# Patient Record
Sex: FEMALE | Race: WHITE | NOT HISPANIC OR LATINO
[De-identification: names, ages, dates, MRNs, and addresses within clinical notes are randomized per-mention and may not be internally consistent; named-entity substitution may affect disease eponyms.]

---

## 2017-01-04 ENCOUNTER — APPOINTMENT (OUTPATIENT)
Dept: OBGYN | Facility: CLINIC | Age: 47
End: 2017-01-04

## 2018-02-27 ENCOUNTER — APPOINTMENT (OUTPATIENT)
Dept: OBGYN | Facility: CLINIC | Age: 48
End: 2018-02-27
Payer: COMMERCIAL

## 2018-02-27 VITALS
SYSTOLIC BLOOD PRESSURE: 130 MMHG | BODY MASS INDEX: 35.49 KG/M2 | WEIGHT: 213 LBS | DIASTOLIC BLOOD PRESSURE: 84 MMHG | HEIGHT: 65 IN

## 2018-02-27 PROCEDURE — 99396 PREV VISIT EST AGE 40-64: CPT

## 2018-02-28 ENCOUNTER — MESSAGE (OUTPATIENT)
Age: 48
End: 2018-02-28

## 2018-02-28 LAB — HPV HIGH+LOW RISK DNA PNL CVX: NOT DETECTED

## 2018-03-01 ENCOUNTER — ASOB RESULT (OUTPATIENT)
Age: 48
End: 2018-03-01

## 2018-03-01 ENCOUNTER — APPOINTMENT (OUTPATIENT)
Dept: OBGYN | Facility: CLINIC | Age: 48
End: 2018-03-01
Payer: COMMERCIAL

## 2018-03-01 PROCEDURE — 76830 TRANSVAGINAL US NON-OB: CPT

## 2018-03-03 ENCOUNTER — MESSAGE (OUTPATIENT)
Age: 48
End: 2018-03-03

## 2018-03-03 LAB — CYTOLOGY CVX/VAG DOC THIN PREP: NORMAL

## 2018-07-31 ENCOUNTER — APPOINTMENT (OUTPATIENT)
Dept: OBGYN | Facility: CLINIC | Age: 48
End: 2018-07-31
Payer: COMMERCIAL

## 2018-07-31 ENCOUNTER — MESSAGE (OUTPATIENT)
Age: 48
End: 2018-07-31

## 2018-07-31 VITALS
HEIGHT: 65 IN | WEIGHT: 200 LBS | BODY MASS INDEX: 33.32 KG/M2 | SYSTOLIC BLOOD PRESSURE: 131 MMHG | DIASTOLIC BLOOD PRESSURE: 88 MMHG

## 2018-07-31 DIAGNOSIS — R10.31 RIGHT LOWER QUADRANT PAIN: ICD-10-CM

## 2018-07-31 DIAGNOSIS — G89.29 RIGHT LOWER QUADRANT PAIN: ICD-10-CM

## 2018-07-31 LAB — HCG UR QL: NEGATIVE

## 2018-07-31 PROCEDURE — 99214 OFFICE O/P EST MOD 30 MIN: CPT

## 2018-07-31 PROCEDURE — 81025 URINE PREGNANCY TEST: CPT

## 2018-08-01 ENCOUNTER — MESSAGE (OUTPATIENT)
Age: 48
End: 2018-08-01

## 2018-08-01 ENCOUNTER — ASOB RESULT (OUTPATIENT)
Age: 48
End: 2018-08-01

## 2018-08-01 ENCOUNTER — APPOINTMENT (OUTPATIENT)
Dept: OBGYN | Facility: CLINIC | Age: 48
End: 2018-08-01
Payer: COMMERCIAL

## 2018-08-01 LAB
C TRACH RRNA SPEC QL NAA+PROBE: NOT DETECTED
N GONORRHOEA RRNA SPEC QL NAA+PROBE: NOT DETECTED
SOURCE AMPLIFICATION: NORMAL

## 2018-08-01 PROCEDURE — 76830 TRANSVAGINAL US NON-OB: CPT

## 2020-03-10 ENCOUNTER — APPOINTMENT (OUTPATIENT)
Dept: OBGYN | Facility: CLINIC | Age: 50
End: 2020-03-10
Payer: COMMERCIAL

## 2020-03-10 VITALS
WEIGHT: 197 LBS | HEIGHT: 65 IN | DIASTOLIC BLOOD PRESSURE: 86 MMHG | BODY MASS INDEX: 32.82 KG/M2 | SYSTOLIC BLOOD PRESSURE: 145 MMHG

## 2020-03-10 PROCEDURE — 99396 PREV VISIT EST AGE 40-64: CPT

## 2020-03-10 NOTE — PHYSICAL EXAM
[Awake] : awake [Alert] : alert [Acute Distress] : no acute distress [Thyroid Nodule] : no thyroid nodule [Goiter] : no goiter [Mass] : no breast mass [Nipple Discharge] : no nipple discharge [Axillary LAD] : no axillary lymphadenopathy [Soft] : soft [Tender] : non tender [Oriented x3] : oriented to person, place, and time [Normal] : uterus [No Bleeding] : there was no active vaginal bleeding [IUD String] : had an IUD string protruding out [Uterine Adnexae] : were not tender and not enlarged [RRR, No Murmurs] : RRR, no murmurs [CTAB] : CTAB

## 2020-03-11 ENCOUNTER — MESSAGE (OUTPATIENT)
Age: 50
End: 2020-03-11

## 2020-03-11 LAB — HPV HIGH+LOW RISK DNA PNL CVX: NOT DETECTED

## 2020-03-13 ENCOUNTER — MESSAGE (OUTPATIENT)
Age: 50
End: 2020-03-13

## 2020-03-13 LAB — CYTOLOGY CVX/VAG DOC THIN PREP: NORMAL

## 2020-03-24 ENCOUNTER — APPOINTMENT (OUTPATIENT)
Dept: OBGYN | Facility: CLINIC | Age: 50
End: 2020-03-24
Payer: COMMERCIAL

## 2020-03-24 ENCOUNTER — ASOB RESULT (OUTPATIENT)
Age: 50
End: 2020-03-24

## 2020-03-24 PROCEDURE — 76830 TRANSVAGINAL US NON-OB: CPT

## 2020-05-18 ENCOUNTER — APPOINTMENT (OUTPATIENT)
Dept: OBGYN | Facility: CLINIC | Age: 50
End: 2020-05-18
Payer: COMMERCIAL

## 2020-05-18 VITALS
WEIGHT: 197 LBS | DIASTOLIC BLOOD PRESSURE: 84 MMHG | BODY MASS INDEX: 32.82 KG/M2 | SYSTOLIC BLOOD PRESSURE: 126 MMHG | HEIGHT: 65 IN

## 2020-05-18 PROCEDURE — 58301 REMOVE INTRAUTERINE DEVICE: CPT

## 2020-05-18 PROCEDURE — 58300 INSERT INTRAUTERINE DEVICE: CPT

## 2020-05-18 NOTE — PROCEDURE
[IUD Placement] : intrauterine device (IUD) placement [Prevention of Pregnancy] : prevention of pregnancy [Infection] : infection [Bleeding] : bleeding [Pain] : pain [Expulsion] : expulsion [Failure] : failure [Uterine Perforation] : uterine perforation [CONSENT OBTAINED] : written consent was obtained prior to the procedure. [Neg Pregnancy Test] : a pregnancy test was negative [LMP ___] : LMP was [unfilled] [No Premedication] : No premedication [Betadine] : Prepped with Betadine [Genoveva] : Prepped with genoveva [Tenaculum] : a single toothed tenaculum [Easy Passage] : allowed easy passage of a uterine sound without dilation [Mirena IUD] : The Mirena IUD was inserted past the internal cervical os. The IUD was then gently inserted upwards toward the fundus.  The IUD strings were cut to an appropriate length. [None] : no post-procedure medications given [IUD Removal] : IUD [ IUD] :  IUD [Mirena] : Mirena [Patient] : patient [Risks] : risks [Benefits] : benefits [Alternatives] : alternatives [Consent Obtained] : consent was obtained prior to the procedure and is detailed in the patient's record [Speculum Placed] : a speculum was placed in the vagina [IUD Removed - Forceps] : the strings were grasped with forceps and the IUD was removed [Strings Visualized] : the IUD strings were visualized [Tolerated Well] : the patient tolerated the procedure well [No Complications] : none [Pelvic Pain] : for pelvic pain [PRN] : as needed

## 2020-05-20 ENCOUNTER — MESSAGE (OUTPATIENT)
Age: 50
End: 2020-05-20

## 2020-05-26 ENCOUNTER — MESSAGE (OUTPATIENT)
Age: 50
End: 2020-05-26

## 2020-05-26 LAB — ACTINOMYCES CULTURE FOR IUD: ABNORMAL

## 2021-12-06 ENCOUNTER — NON-APPOINTMENT (OUTPATIENT)
Age: 51
End: 2021-12-06

## 2021-12-09 ENCOUNTER — APPOINTMENT (OUTPATIENT)
Dept: OBGYN | Facility: CLINIC | Age: 51
End: 2021-12-09
Payer: COMMERCIAL

## 2021-12-09 VITALS
BODY MASS INDEX: 32.49 KG/M2 | WEIGHT: 195 LBS | SYSTOLIC BLOOD PRESSURE: 136 MMHG | HEIGHT: 65 IN | DIASTOLIC BLOOD PRESSURE: 86 MMHG

## 2021-12-09 PROCEDURE — 99396 PREV VISIT EST AGE 40-64: CPT

## 2021-12-09 NOTE — HISTORY OF PRESENT ILLNESS
[FreeTextEntry1] : 50yo postmenopausal female [id number] 2/2 Mirena  presents for annual GYN visit\par Patient denies any GYN complaints \par \par Sexual Activity:none\par Periods: amenorrheic\par \par ROS: Denies fever/chills, HA, Cough/sore throat, CP, SOB, N/V, Diarrhea/Constipation, Pelvic pain, Urinary frequency/ urgency/ Incontinence, postmenopausal vaginal bleeding, abnormal vaginal discharge or irritation, Vasomotor symptoms, Moodiness,  and trouble sleeping\par \par New medical dx since last visit: none\par \par \par PCP:yes\par Physical Activity: working to improve\par Diet:working to improve\par VitaminD&Ca:yes\par \par \par F/U: patient to follow up in one year for annual GYN exam unless otherwise needed\par \par \par  [Patient reported mammogram was normal] : Patient reported mammogram was normal [Patient reported breast sonogram was normal] : Patient reported breast sonogram was normal [Patient reported PAP Smear was normal] : Patient reported PAP Smear was normal [Mammogramdate] : Nov 2021 [BreastSonogramDate] : Nov 2021 [PapSmeardate] : 3/2020 [ColonoscopyDate] : 3years ago [TextBox_43] : polyp, scheduling next colonoscopy

## 2021-12-09 NOTE — PLAN
[FreeTextEntry1] : Annual: orders and exams as above\par \par GYN counseling: Patient instructed to call for Unusual Pelvic pain,  UTI symptoms, irregular vaginal bleeding/discharge- Patient verbalized agreement\par \par F/u in 1 year unless otherwise needed\par

## 2021-12-09 NOTE — PHYSICAL EXAM
[Appropriately responsive] : appropriately responsive [Soft] : soft [Oriented x3] : oriented x3 [Examination Of The Breasts] : a normal appearance [No Discharge] : no discharge [No Masses] : no breast masses were palpable [Normal] : normal [Tenderness] : nontender [Uterine Adnexae] : normal

## 2021-12-10 LAB — HPV HIGH+LOW RISK DNA PNL CVX: NOT DETECTED

## 2021-12-14 LAB — CYTOLOGY CVX/VAG DOC THIN PREP: NORMAL

## 2022-01-13 ENCOUNTER — APPOINTMENT (OUTPATIENT)
Dept: OBGYN | Facility: CLINIC | Age: 52
End: 2022-01-13

## 2023-09-07 ENCOUNTER — APPOINTMENT (OUTPATIENT)
Dept: OBGYN | Facility: CLINIC | Age: 53
End: 2023-09-07
Payer: COMMERCIAL

## 2023-09-07 VITALS
SYSTOLIC BLOOD PRESSURE: 143 MMHG | DIASTOLIC BLOOD PRESSURE: 86 MMHG | HEIGHT: 65 IN | WEIGHT: 192 LBS | BODY MASS INDEX: 31.99 KG/M2

## 2023-09-07 DIAGNOSIS — L40.50 ARTHROPATHIC PSORIASIS, UNSPECIFIED: ICD-10-CM

## 2023-09-07 DIAGNOSIS — Z01.419 ENCOUNTER FOR GYNECOLOGICAL EXAMINATION (GENERAL) (ROUTINE) W/OUT ABNORMAL FINDINGS: ICD-10-CM

## 2023-09-07 DIAGNOSIS — I10 ESSENTIAL (PRIMARY) HYPERTENSION: ICD-10-CM

## 2023-09-07 PROCEDURE — 99396 PREV VISIT EST AGE 40-64: CPT

## 2023-09-07 RX ORDER — TRAZODONE HYDROCHLORIDE 50 MG/1
50 TABLET ORAL
Refills: 0 | Status: ACTIVE | COMMUNITY

## 2023-09-07 RX ORDER — MULTIVITAMIN
TABLET ORAL
Refills: 0 | Status: ACTIVE | COMMUNITY

## 2023-09-07 RX ORDER — ESOMEPRAZOLE MAGNESIUM 40 MG/1
CAPSULE, DELAYED RELEASE ORAL
Refills: 0 | Status: ACTIVE | COMMUNITY

## 2023-09-07 RX ORDER — PSEUDOEPHEDRINE HYDROCHLORIDE 30 MG/1
TABLET, COATED ORAL
Refills: 0 | Status: ACTIVE | COMMUNITY

## 2023-09-07 RX ORDER — AMLODIPINE BESYLATE 5 MG/1
TABLET ORAL
Refills: 0 | Status: ACTIVE | COMMUNITY

## 2023-09-07 RX ORDER — SERTRALINE HYDROCHLORIDE 150 MG/1
CAPSULE ORAL
Refills: 0 | Status: ACTIVE | COMMUNITY

## 2023-09-07 RX ORDER — TIZANIDINE HYDROCHLORIDE 6 MG/1
CAPSULE ORAL
Refills: 0 | Status: ACTIVE | COMMUNITY

## 2023-09-07 NOTE — DISCUSSION/SUMMARY
[FreeTextEntry1] : 54yo with mirena IUD inserted 2020 - discussed can leave in, unsure if and when will go through menopause but can leave in for awhile longer

## 2023-09-07 NOTE — PHYSICAL EXAM
[Chaperone Present] : A chaperone was present in the examining room during all aspects of the physical examination [Appropriately responsive] : appropriately responsive [Alert] : alert [No Acute Distress] : no acute distress [Soft] : soft [Non-tender] : non-tender [Non-distended] : non-distended [Oriented x3] : oriented x3 [Examination Of The Breasts] : a normal appearance [No Masses] : no breast masses were palpable [Labia Majora] : normal [Labia Minora] : normal [IUD String] : an IUD string was noted [Normal] : normal [Uterine Adnexae] : normal

## 2023-09-07 NOTE — HISTORY OF PRESENT ILLNESS
[Mammogramdate] : 2 years pr pt  [TextBox_4] : 54yo presents for annual exam history significant for endometriosis, left oophorectomy, fibroids, menorrhagia, mirena 2015 - last replaced 5/2020 no complaints    [PapSmeardate] : 2021 [ColonoscopyDate] : due

## 2023-09-10 LAB — HPV HIGH+LOW RISK DNA PNL CVX: NOT DETECTED

## 2023-09-12 LAB — CYTOLOGY CVX/VAG DOC THIN PREP: NORMAL

## 2023-11-21 ENCOUNTER — OFFICE (OUTPATIENT)
Dept: URBAN - METROPOLITAN AREA CLINIC 109 | Facility: CLINIC | Age: 53
Setting detail: OPHTHALMOLOGY
End: 2023-11-21
Payer: COMMERCIAL

## 2023-11-21 DIAGNOSIS — H02.832: ICD-10-CM

## 2023-11-21 DIAGNOSIS — E11.9: ICD-10-CM

## 2023-11-21 DIAGNOSIS — H02.835: ICD-10-CM

## 2023-11-21 DIAGNOSIS — H02.834: ICD-10-CM

## 2023-11-21 DIAGNOSIS — H47.012: ICD-10-CM

## 2023-11-21 DIAGNOSIS — H02.831: ICD-10-CM

## 2023-11-21 PROCEDURE — 92083 EXTENDED VISUAL FIELD XM: CPT | Performed by: OPHTHALMOLOGY

## 2023-11-21 PROCEDURE — 92004 COMPRE OPH EXAM NEW PT 1/>: CPT | Performed by: OPHTHALMOLOGY

## 2023-11-21 PROCEDURE — 92134 CPTRZ OPH DX IMG PST SGM RTA: CPT | Performed by: OPHTHALMOLOGY

## 2023-11-21 ASSESSMENT — REFRACTION_CURRENTRX
OD_CYLINDER: -3.25
OD_OVR_VA: 20/
OS_AXIS: 155
OS_SPHERE: -7.50
OD_AXIS: 10
OS_CYLINDER: -2.75
OD_SPHERE: -5.25
OS_OVR_VA: 20/

## 2023-11-21 ASSESSMENT — REFRACTION_AUTOREFRACTION
OS_AXIS: 166
OS_CYLINDER: -3.50
OD_SPHERE: -5.50
OS_SPHERE: -8.00
OD_AXIS: 10
OD_CYLINDER: -3.50

## 2023-11-21 ASSESSMENT — REFRACTION_MANIFEST
OD_SPHERE: -5.50
OD_AXIS: 5
OS_VA1: 20/20-
OS_CYLINDER: -2.25
OD_CYLINDER: -2.50
OS_SPHERE: -7.25
OS_AXIS: 155
OD_VA1: 20/20-

## 2023-11-21 ASSESSMENT — SPHEQUIV_DERIVED
OD_SPHEQUIV: -7.25
OS_SPHEQUIV: -8.375
OS_SPHEQUIV: -9.75
OD_SPHEQUIV: -6.75

## 2023-11-21 ASSESSMENT — CONFRONTATIONAL VISUAL FIELD TEST (CVF)
OD_FINDINGS: FULL
OS_FINDINGS: FULL

## 2023-11-21 ASSESSMENT — LID POSITION - DERMATOCHALASIS
OD_DERMATOCHALASIS: RLL RUL 1+
OS_DERMATOCHALASIS: LLL LUL 1+

## 2023-12-01 ENCOUNTER — OFFICE (OUTPATIENT)
Facility: LOCATION | Age: 53
Setting detail: OPHTHALMOLOGY
End: 2023-12-01
Payer: COMMERCIAL

## 2023-12-01 DIAGNOSIS — H47.012: ICD-10-CM

## 2023-12-01 DIAGNOSIS — H53.433: ICD-10-CM

## 2023-12-01 PROBLEM — E11.9 DIABETES TYPE 2 NO RETINOPATHY ; BOTH EYES: Status: ACTIVE | Noted: 2023-11-21

## 2023-12-01 PROBLEM — H02.831 DERMATOCHALASIS; RIGHT UPPER LID, RIGHT LOWER LID, LEFT UPPER LID, LEFT LOWER LID: Status: ACTIVE | Noted: 2023-11-21

## 2023-12-01 PROBLEM — H02.835 DERMATOCHALASIS; RIGHT UPPER LID, RIGHT LOWER LID, LEFT UPPER LID, LEFT LOWER LID: Status: ACTIVE | Noted: 2023-11-21

## 2023-12-01 PROBLEM — H02.832 DERMATOCHALASIS; RIGHT UPPER LID, RIGHT LOWER LID, LEFT UPPER LID, LEFT LOWER LID: Status: ACTIVE | Noted: 2023-11-21

## 2023-12-01 PROBLEM — H02.834 DERMATOCHALASIS; RIGHT UPPER LID, RIGHT LOWER LID, LEFT UPPER LID, LEFT LOWER LID: Status: ACTIVE | Noted: 2023-11-21

## 2023-12-01 PROCEDURE — 92012 INTRM OPH EXAM EST PATIENT: CPT | Performed by: OPHTHALMOLOGY

## 2023-12-01 PROCEDURE — 92083 EXTENDED VISUAL FIELD XM: CPT | Performed by: OPHTHALMOLOGY

## 2023-12-01 PROCEDURE — 92133 CPTRZD OPH DX IMG PST SGM ON: CPT | Performed by: OPHTHALMOLOGY

## 2023-12-01 ASSESSMENT — SPHEQUIV_DERIVED
OD_SPHEQUIV: -8.25
OS_SPHEQUIV: -8.375
OD_SPHEQUIV: -6.75
OS_SPHEQUIV: -10.75

## 2023-12-01 ASSESSMENT — REFRACTION_MANIFEST
OS_AXIS: 155
OD_CYLINDER: -2.50
OS_CYLINDER: -2.25
OD_AXIS: 5
OS_SPHERE: -7.25
OD_SPHERE: -5.50
OS_VA1: 20/20-
OD_VA1: 20/20-

## 2023-12-01 ASSESSMENT — REFRACTION_AUTOREFRACTION
OS_AXIS: 164
OS_CYLINDER: -3.50
OD_CYLINDER: -3.50
OD_SPHERE: -6.50
OD_AXIS: 7
OS_SPHERE: -9.00

## 2023-12-01 ASSESSMENT — CONFRONTATIONAL VISUAL FIELD TEST (CVF)
OD_FINDINGS: FULL
OS_FINDINGS: FULL

## 2023-12-01 ASSESSMENT — REFRACTION_CURRENTRX
OD_AXIS: 20
OS_AXIS: 167
OS_CYLINDER: -2.75
OD_OVR_VA: 20/
OD_SPHERE: -5.50
OD_CYLINDER: -3.25
OS_OVR_VA: 20/
OS_SPHERE: -7.50

## 2023-12-01 ASSESSMENT — LID POSITION - DERMATOCHALASIS
OD_DERMATOCHALASIS: RLL RUL 1+
OS_DERMATOCHALASIS: LLL LUL 1+

## 2023-12-12 ENCOUNTER — OFFICE (OUTPATIENT)
Dept: URBAN - METROPOLITAN AREA CLINIC 109 | Facility: CLINIC | Age: 53
Setting detail: OPHTHALMOLOGY
End: 2023-12-12
Payer: COMMERCIAL

## 2023-12-12 DIAGNOSIS — H53.433: ICD-10-CM

## 2023-12-12 DIAGNOSIS — H02.834: ICD-10-CM

## 2023-12-12 DIAGNOSIS — H02.832: ICD-10-CM

## 2023-12-12 DIAGNOSIS — H02.831: ICD-10-CM

## 2023-12-12 DIAGNOSIS — E11.9: ICD-10-CM

## 2023-12-12 DIAGNOSIS — H47.012: ICD-10-CM

## 2023-12-12 DIAGNOSIS — H02.835: ICD-10-CM

## 2023-12-12 PROCEDURE — 99213 OFFICE O/P EST LOW 20 MIN: CPT | Performed by: OPHTHALMOLOGY

## 2023-12-12 ASSESSMENT — SPHEQUIV_DERIVED
OS_SPHEQUIV: -8.375
OS_SPHEQUIV: -10.75
OD_SPHEQUIV: -8.25
OD_SPHEQUIV: -6.75

## 2023-12-12 ASSESSMENT — REFRACTION_CURRENTRX
OS_SPHERE: -7.50
OS_CYLINDER: -2.75
OD_AXIS: 20
OD_OVR_VA: 20/
OS_OVR_VA: 20/
OS_AXIS: 167
OD_CYLINDER: -3.25
OD_SPHERE: -5.50

## 2023-12-12 ASSESSMENT — REFRACTION_AUTOREFRACTION
OS_SPHERE: -9.00
OS_AXIS: 164
OD_SPHERE: -6.50
OD_CYLINDER: -3.50
OS_CYLINDER: -3.50
OD_AXIS: 7

## 2023-12-12 ASSESSMENT — LID POSITION - DERMATOCHALASIS
OS_DERMATOCHALASIS: LLL LUL 1+
OD_DERMATOCHALASIS: RLL RUL 1+

## 2023-12-12 ASSESSMENT — REFRACTION_MANIFEST
OS_VA1: 20/20-
OS_SPHERE: -7.25
OD_SPHERE: -5.50
OD_AXIS: 5
OS_CYLINDER: -2.25
OS_AXIS: 155
OD_CYLINDER: -2.50
OD_VA1: 20/20-

## 2023-12-12 ASSESSMENT — CONFRONTATIONAL VISUAL FIELD TEST (CVF)
OD_FINDINGS: FULL
OS_FINDINGS: FULL

## 2023-12-29 ENCOUNTER — OFFICE (OUTPATIENT)
Facility: LOCATION | Age: 53
Setting detail: OPHTHALMOLOGY
End: 2023-12-29
Payer: COMMERCIAL

## 2023-12-29 DIAGNOSIS — H53.433: ICD-10-CM

## 2023-12-29 DIAGNOSIS — H47.012: ICD-10-CM

## 2023-12-29 DIAGNOSIS — E11.9: ICD-10-CM

## 2023-12-29 PROCEDURE — 99213 OFFICE O/P EST LOW 20 MIN: CPT | Performed by: OPHTHALMOLOGY

## 2023-12-29 PROCEDURE — 92083 EXTENDED VISUAL FIELD XM: CPT | Performed by: OPHTHALMOLOGY

## 2023-12-29 ASSESSMENT — SPHEQUIV_DERIVED
OS_SPHEQUIV: -9.875
OS_SPHEQUIV: -8.375
OD_SPHEQUIV: -7.625
OD_SPHEQUIV: -6.75

## 2023-12-29 ASSESSMENT — REFRACTION_MANIFEST
OD_VA1: 20/20-
OD_CYLINDER: -2.50
OS_SPHERE: -7.25
OS_AXIS: 155
OD_SPHERE: -5.50
OS_CYLINDER: -2.25
OD_AXIS: 5
OS_VA1: 20/20-

## 2023-12-29 ASSESSMENT — REFRACTION_CURRENTRX
OS_CYLINDER: -2.75
OD_SPHERE: -5.50
OD_AXIS: 20
OD_CYLINDER: -3.25
OS_SPHERE: -7.50
OS_AXIS: 167
OS_OVR_VA: 20/
OD_OVR_VA: 20/

## 2023-12-29 ASSESSMENT — REFRACTION_AUTOREFRACTION
OS_SPHERE: -8.50
OD_AXIS: 009
OS_CYLINDER: -2.75
OD_SPHERE: -6.00
OS_AXIS: 162
OD_CYLINDER: -3.25

## 2023-12-29 ASSESSMENT — CONFRONTATIONAL VISUAL FIELD TEST (CVF)
OD_FINDINGS: FULL
OS_FINDINGS: FULL

## 2023-12-29 ASSESSMENT — LID POSITION - DERMATOCHALASIS
OS_DERMATOCHALASIS: LLL LUL 1+
OD_DERMATOCHALASIS: RLL RUL 1+

## 2024-03-12 ENCOUNTER — OFFICE (OUTPATIENT)
Dept: URBAN - METROPOLITAN AREA CLINIC 109 | Facility: CLINIC | Age: 54
Setting detail: OPHTHALMOLOGY
End: 2024-03-12
Payer: COMMERCIAL

## 2024-03-12 DIAGNOSIS — H47.013: ICD-10-CM

## 2024-03-12 DIAGNOSIS — H53.433: ICD-10-CM

## 2024-03-12 DIAGNOSIS — E11.9: ICD-10-CM

## 2024-03-12 PROCEDURE — 92083 EXTENDED VISUAL FIELD XM: CPT | Performed by: OPHTHALMOLOGY

## 2024-03-12 PROCEDURE — 99213 OFFICE O/P EST LOW 20 MIN: CPT | Performed by: OPHTHALMOLOGY

## 2024-03-12 ASSESSMENT — REFRACTION_CURRENTRX
OD_OVR_VA: 20/
OD_AXIS: 20
OS_SPHERE: -7.50
OD_SPHERE: -5.50
OS_OVR_VA: 20/
OD_CYLINDER: -3.25
OS_CYLINDER: -2.75
OS_AXIS: 167

## 2024-03-12 ASSESSMENT — REFRACTION_MANIFEST
OS_CYLINDER: -2.25
OD_AXIS: 5
OS_AXIS: 155
OS_SPHERE: -7.25
OD_VA1: 20/20-
OD_SPHERE: -5.50
OD_CYLINDER: -2.50
OS_VA1: 20/50+

## 2024-03-12 ASSESSMENT — LID POSITION - DERMATOCHALASIS
OS_DERMATOCHALASIS: LLL LUL 1+
OD_DERMATOCHALASIS: RLL RUL 1+

## 2024-03-12 ASSESSMENT — SPHEQUIV_DERIVED
OS_SPHEQUIV: -8.375
OD_SPHEQUIV: -6.75

## 2024-03-15 ENCOUNTER — OFFICE (OUTPATIENT)
Facility: LOCATION | Age: 54
Setting detail: OPHTHALMOLOGY
End: 2024-03-15
Payer: COMMERCIAL

## 2024-03-15 DIAGNOSIS — H53.433: ICD-10-CM

## 2024-03-15 DIAGNOSIS — E11.9: ICD-10-CM

## 2024-03-15 DIAGNOSIS — H47.011: ICD-10-CM

## 2024-03-15 DIAGNOSIS — H47.012: ICD-10-CM

## 2024-03-15 PROCEDURE — 92083 EXTENDED VISUAL FIELD XM: CPT | Performed by: OPHTHALMOLOGY

## 2024-03-15 PROCEDURE — 92012 INTRM OPH EXAM EST PATIENT: CPT | Performed by: OPHTHALMOLOGY

## 2024-03-15 PROCEDURE — 92133 CPTRZD OPH DX IMG PST SGM ON: CPT | Performed by: OPHTHALMOLOGY

## 2024-03-15 ASSESSMENT — REFRACTION_MANIFEST
OD_VA1: 20/20-
OS_AXIS: 155
OS_VA1: 20/50+
OS_SPHERE: -7.25
OD_CYLINDER: -2.50
OD_AXIS: 5
OD_SPHERE: -5.50
OS_CYLINDER: -2.25

## 2024-03-15 ASSESSMENT — REFRACTION_CURRENTRX
OD_SPHERE: -5.50
OD_OVR_VA: 20/
OS_CYLINDER: -2.75
OS_SPHERE: -7.50
OS_AXIS: 163
OS_OVR_VA: 20/
OD_AXIS: 019
OD_CYLINDER: -3.25

## 2024-03-15 ASSESSMENT — SPHEQUIV_DERIVED
OD_SPHEQUIV: -6.75
OS_SPHEQUIV: -8.375

## 2024-03-15 ASSESSMENT — LID POSITION - DERMATOCHALASIS
OS_DERMATOCHALASIS: LLL LUL 1+
OD_DERMATOCHALASIS: RLL RUL 1+

## 2024-03-19 ENCOUNTER — OFFICE (OUTPATIENT)
Dept: URBAN - METROPOLITAN AREA CLINIC 109 | Facility: CLINIC | Age: 54
Setting detail: OPHTHALMOLOGY
End: 2024-03-19
Payer: COMMERCIAL

## 2024-03-19 DIAGNOSIS — H53.433: ICD-10-CM

## 2024-03-19 DIAGNOSIS — E11.9: ICD-10-CM

## 2024-03-19 DIAGNOSIS — H47.011: ICD-10-CM

## 2024-03-19 PROBLEM — H47.012 ISCHAEMIC OPTIC NEUROPATHY; LEFT EYE: Status: ACTIVE | Noted: 2024-03-19

## 2024-03-19 PROCEDURE — 99213 OFFICE O/P EST LOW 20 MIN: CPT | Performed by: OPHTHALMOLOGY

## 2024-03-19 ASSESSMENT — REFRACTION_CURRENTRX
OD_AXIS: 020
OS_AXIS: 160
OD_OVR_VA: 20/
OS_OVR_VA: 20/
OD_SPHERE: -5.25
OD_CYLINDER: -3.25
OS_SPHERE: -7.50
OS_CYLINDER: -2.75

## 2024-03-19 ASSESSMENT — REFRACTION_MANIFEST
OS_AXIS: 155
OS_CYLINDER: -2.25
OS_SPHERE: -7.25
OD_CYLINDER: -2.50
OD_AXIS: 5
OD_VA1: 20/20-
OS_VA1: 20/50+
OD_SPHERE: -5.50

## 2024-03-19 ASSESSMENT — LID POSITION - DERMATOCHALASIS
OD_DERMATOCHALASIS: RLL RUL 1+
OS_DERMATOCHALASIS: LLL LUL 1+

## 2024-03-19 ASSESSMENT — SPHEQUIV_DERIVED
OD_SPHEQUIV: -6.75
OS_SPHEQUIV: -8.375

## 2024-04-30 ENCOUNTER — OFFICE (OUTPATIENT)
Dept: URBAN - METROPOLITAN AREA CLINIC 109 | Facility: CLINIC | Age: 54
Setting detail: OPHTHALMOLOGY
End: 2024-04-30
Payer: COMMERCIAL

## 2024-04-30 DIAGNOSIS — H47.011: ICD-10-CM

## 2024-04-30 PROBLEM — H47.012 ISCHAEMIC OPTIC NEUROPATHY; LEFT EYE: Status: ACTIVE | Noted: 2024-04-30

## 2024-04-30 PROBLEM — H02.834 DERMATOCHALASIS; RIGHT UPPER LID, RIGHT LOWER LID, LEFT UPPER LID, LEFT LOWER LID: Status: ACTIVE | Noted: 2024-04-30

## 2024-04-30 PROBLEM — H02.831 DERMATOCHALASIS; RIGHT UPPER LID, RIGHT LOWER LID, LEFT UPPER LID, LEFT LOWER LID: Status: ACTIVE | Noted: 2024-04-30

## 2024-04-30 PROBLEM — H02.832 DERMATOCHALASIS; RIGHT UPPER LID, RIGHT LOWER LID, LEFT UPPER LID, LEFT LOWER LID: Status: ACTIVE | Noted: 2024-04-30

## 2024-04-30 PROBLEM — H02.835 DERMATOCHALASIS; RIGHT UPPER LID, RIGHT LOWER LID, LEFT UPPER LID, LEFT LOWER LID: Status: ACTIVE | Noted: 2024-04-30

## 2024-04-30 PROCEDURE — 99213 OFFICE O/P EST LOW 20 MIN: CPT | Performed by: OPHTHALMOLOGY

## 2024-04-30 ASSESSMENT — LID POSITION - DERMATOCHALASIS
OS_DERMATOCHALASIS: LLL LUL 1+
OD_DERMATOCHALASIS: RLL RUL 1+

## 2024-05-03 ENCOUNTER — OFFICE (OUTPATIENT)
Facility: LOCATION | Age: 54
Setting detail: OPHTHALMOLOGY
End: 2024-05-03
Payer: COMMERCIAL

## 2024-05-03 DIAGNOSIS — H53.433: ICD-10-CM

## 2024-05-03 DIAGNOSIS — H47.011: ICD-10-CM

## 2024-05-03 DIAGNOSIS — H47.012: ICD-10-CM

## 2024-05-03 PROCEDURE — 92083 EXTENDED VISUAL FIELD XM: CPT | Performed by: OPHTHALMOLOGY

## 2024-05-03 PROCEDURE — 92133 CPTRZD OPH DX IMG PST SGM ON: CPT | Performed by: OPHTHALMOLOGY

## 2024-05-03 PROCEDURE — 92012 INTRM OPH EXAM EST PATIENT: CPT | Performed by: OPHTHALMOLOGY

## 2024-05-03 ASSESSMENT — LID POSITION - DERMATOCHALASIS
OS_DERMATOCHALASIS: LLL LUL 1+
OD_DERMATOCHALASIS: RLL RUL 1+

## 2024-05-03 ASSESSMENT — CONFRONTATIONAL VISUAL FIELD TEST (CVF)
OS_FINDINGS: FULL
OD_FINDINGS: FULL

## 2024-05-07 ENCOUNTER — OFFICE (OUTPATIENT)
Facility: LOCATION | Age: 54
Setting detail: OPHTHALMOLOGY
End: 2024-05-07
Payer: COMMERCIAL

## 2024-05-07 DIAGNOSIS — H47.012: ICD-10-CM

## 2024-05-07 DIAGNOSIS — H53.433: ICD-10-CM

## 2024-05-07 PROBLEM — H47.011: Status: ACTIVE | Noted: 2024-05-07

## 2024-05-07 PROCEDURE — 92083 EXTENDED VISUAL FIELD XM: CPT | Performed by: OPHTHALMOLOGY

## 2024-05-07 PROCEDURE — 92012 INTRM OPH EXAM EST PATIENT: CPT | Performed by: OPHTHALMOLOGY

## 2024-05-07 ASSESSMENT — CONFRONTATIONAL VISUAL FIELD TEST (CVF)
OD_FINDINGS: FULL
OS_FINDINGS: FULL

## 2024-05-07 ASSESSMENT — LID POSITION - DERMATOCHALASIS
OS_DERMATOCHALASIS: LLL LUL 1+
OD_DERMATOCHALASIS: RLL RUL 1+

## 2024-06-07 ENCOUNTER — OFFICE (OUTPATIENT)
Facility: LOCATION | Age: 54
Setting detail: OPHTHALMOLOGY
End: 2024-06-07
Payer: COMMERCIAL

## 2024-06-07 ENCOUNTER — RX ONLY (RX ONLY)
Age: 54
End: 2024-06-07

## 2024-06-07 DIAGNOSIS — H53.433: ICD-10-CM

## 2024-06-07 DIAGNOSIS — H47.013: ICD-10-CM

## 2024-06-07 PROBLEM — H47.011: Status: ACTIVE | Noted: 2024-06-07

## 2024-06-07 PROCEDURE — 92012 INTRM OPH EXAM EST PATIENT: CPT | Performed by: OPHTHALMOLOGY

## 2024-06-07 PROCEDURE — 92083 EXTENDED VISUAL FIELD XM: CPT | Performed by: OPHTHALMOLOGY

## 2024-06-07 ASSESSMENT — LID POSITION - DERMATOCHALASIS
OD_DERMATOCHALASIS: RLL RUL 1+
OS_DERMATOCHALASIS: LLL LUL 1+

## 2024-06-07 ASSESSMENT — CONFRONTATIONAL VISUAL FIELD TEST (CVF)
OD_FINDINGS: FULL
OS_FINDINGS: FULL

## 2024-07-25 ENCOUNTER — OFFICE (OUTPATIENT)
Dept: URBAN - METROPOLITAN AREA CLINIC 109 | Facility: CLINIC | Age: 54
Setting detail: OPHTHALMOLOGY
End: 2024-07-25
Payer: COMMERCIAL

## 2024-07-25 DIAGNOSIS — H02.834: ICD-10-CM

## 2024-07-25 DIAGNOSIS — H53.433: ICD-10-CM

## 2024-07-25 DIAGNOSIS — H47.012: ICD-10-CM

## 2024-07-25 DIAGNOSIS — H02.832: ICD-10-CM

## 2024-07-25 DIAGNOSIS — H02.831: ICD-10-CM

## 2024-07-25 DIAGNOSIS — H40.053: ICD-10-CM

## 2024-07-25 DIAGNOSIS — E11.9: ICD-10-CM

## 2024-07-25 DIAGNOSIS — H47.011: ICD-10-CM

## 2024-07-25 DIAGNOSIS — H02.835: ICD-10-CM

## 2024-07-25 PROCEDURE — 92014 COMPRE OPH EXAM EST PT 1/>: CPT | Performed by: OPHTHALMOLOGY

## 2024-07-25 PROCEDURE — 76514 ECHO EXAM OF EYE THICKNESS: CPT | Performed by: OPHTHALMOLOGY

## 2024-07-25 ASSESSMENT — CONFRONTATIONAL VISUAL FIELD TEST (CVF)
OS_COMMENTS: VARIABLE
OD_FINDINGS: FULL
OS_FINDINGS: FULL
OD_COMMENTS: VARIABLE

## 2024-07-25 ASSESSMENT — LID POSITION - DERMATOCHALASIS
OD_DERMATOCHALASIS: RLL RUL 1+
OS_DERMATOCHALASIS: LLL LUL 1+

## 2024-08-23 ENCOUNTER — OFFICE (OUTPATIENT)
Facility: LOCATION | Age: 54
Setting detail: OPHTHALMOLOGY
End: 2024-08-23
Payer: COMMERCIAL

## 2024-08-23 DIAGNOSIS — H53.433: ICD-10-CM

## 2024-08-23 DIAGNOSIS — H47.013: ICD-10-CM

## 2024-08-23 PROCEDURE — 92133 CPTRZD OPH DX IMG PST SGM ON: CPT | Performed by: OPHTHALMOLOGY

## 2024-08-23 PROCEDURE — 92012 INTRM OPH EXAM EST PATIENT: CPT | Performed by: OPHTHALMOLOGY

## 2024-08-23 PROCEDURE — 92083 EXTENDED VISUAL FIELD XM: CPT | Performed by: OPHTHALMOLOGY

## 2024-08-23 ASSESSMENT — LID POSITION - DERMATOCHALASIS
OS_DERMATOCHALASIS: LLL LUL 1+
OD_DERMATOCHALASIS: RLL RUL 1+

## 2024-08-23 ASSESSMENT — CONFRONTATIONAL VISUAL FIELD TEST (CVF)
OD_FINDINGS: FULL
OS_FINDINGS: FULL

## 2024-10-23 ENCOUNTER — OFFICE (OUTPATIENT)
Dept: URBAN - METROPOLITAN AREA CLINIC 109 | Facility: CLINIC | Age: 54
Setting detail: OPHTHALMOLOGY
End: 2024-10-23
Payer: COMMERCIAL

## 2024-10-23 DIAGNOSIS — H02.831: ICD-10-CM

## 2024-10-23 DIAGNOSIS — H40.053: ICD-10-CM

## 2024-10-23 DIAGNOSIS — H02.832: ICD-10-CM

## 2024-10-23 DIAGNOSIS — H02.835: ICD-10-CM

## 2024-10-23 DIAGNOSIS — H53.433: ICD-10-CM

## 2024-10-23 DIAGNOSIS — H02.834: ICD-10-CM

## 2024-10-23 DIAGNOSIS — H47.013: ICD-10-CM

## 2024-10-23 DIAGNOSIS — E11.9: ICD-10-CM

## 2024-10-23 PROCEDURE — 99213 OFFICE O/P EST LOW 20 MIN: CPT | Performed by: OPHTHALMOLOGY

## 2024-10-23 ASSESSMENT — REFRACTION_MANIFEST
OS_AXIS: 155
OD_CYLINDER: -2.50
OS_SPHERE: -7.25
OS_VA1: 20/50+
OD_AXIS: 5
OD_SPHERE: -5.50
OS_CYLINDER: -2.25
OD_VA1: 20/20-

## 2024-10-23 ASSESSMENT — REFRACTION_CURRENTRX
OD_AXIS: 012
OD_OVR_VA: 20/
OS_OVR_VA: 20/
OS_CYLINDER: -2.75
OS_SPHERE: -7.50
OS_AXIS: 158
OD_SPHERE: -5.25
OD_CYLINDER: -3.25

## 2024-10-23 ASSESSMENT — PACHYMETRY
OS_CT_UM: 586
OD_CT_UM: 583
OS_CT_CORRECTION: -3
OD_CT_CORRECTION: -3

## 2024-10-23 ASSESSMENT — TONOMETRY
OD_IOP_MMHG: 17
OD_IOP_MMHG: 17
OS_IOP_MMHG: 19
OS_IOP_MMHG: 19

## 2024-10-23 ASSESSMENT — REFRACTION_AUTOREFRACTION
OD_AXIS: 10
OD_SPHERE: -5.75
OS_CYLINDER: -3.00
OS_AXIS: 165
OS_SPHERE: -8.50
OD_CYLINDER: -4.00

## 2024-10-23 ASSESSMENT — KERATOMETRY
OS_K1POWER_DIOPTERS: 45.50
METHOD_AUTO_MANUAL: AUTO
OS_K2POWER_DIOPTERS: 48.75
OD_K2POWER_DIOPTERS: 49.25
OS_AXISANGLE_DEGREES: 076
OD_AXISANGLE_DEGREES: 094
OD_K1POWER_DIOPTERS: 45.75

## 2024-10-23 ASSESSMENT — CONFRONTATIONAL VISUAL FIELD TEST (CVF)
OD_FINDINGS: CONSTRICTION
OS_FINDINGS: FULL

## 2024-10-23 ASSESSMENT — LID POSITION - DERMATOCHALASIS
OD_DERMATOCHALASIS: RLL RUL 1+
OS_DERMATOCHALASIS: LLL LUL 1+

## 2024-10-23 ASSESSMENT — VISUAL ACUITY
OS_BCVA: 20/25-1
OD_BCVA: 20/60-2

## 2024-11-21 ENCOUNTER — OFFICE (OUTPATIENT)
Facility: LOCATION | Age: 54
Setting detail: OPHTHALMOLOGY
End: 2024-11-21
Payer: COMMERCIAL

## 2024-11-21 DIAGNOSIS — H47.013: ICD-10-CM

## 2024-11-21 DIAGNOSIS — H52.4: ICD-10-CM

## 2024-11-21 DIAGNOSIS — H53.433: ICD-10-CM

## 2024-11-21 DIAGNOSIS — H25.13: ICD-10-CM

## 2024-11-21 DIAGNOSIS — H47.20: ICD-10-CM

## 2024-11-21 DIAGNOSIS — E11.9: ICD-10-CM

## 2024-11-21 DIAGNOSIS — H35.033: ICD-10-CM

## 2024-11-21 PROCEDURE — 92012 INTRM OPH EXAM EST PATIENT: CPT | Performed by: OPHTHALMOLOGY

## 2024-11-21 PROCEDURE — 92250 FUNDUS PHOTOGRAPHY W/I&R: CPT | Performed by: OPHTHALMOLOGY

## 2024-11-21 PROCEDURE — 92083 EXTENDED VISUAL FIELD XM: CPT | Performed by: OPHTHALMOLOGY

## 2024-11-21 PROCEDURE — 92015 DETERMINE REFRACTIVE STATE: CPT | Performed by: OPHTHALMOLOGY

## 2024-11-21 ASSESSMENT — REFRACTION_CURRENTRX
OD_SPHERE: -5.25
OS_OVR_VA: 20/
OD_CYLINDER: -3.25
OS_AXIS: 158
OS_CYLINDER: -2.75
OD_AXIS: 019
OD_VPRISM_DIRECTION: SV
OS_SPHERE: -7.50
OD_OVR_VA: 20/
OS_VPRISM_DIRECTION: SV

## 2024-11-21 ASSESSMENT — REFRACTION_AUTOREFRACTION
OD_AXIS: 007
OS_CYLINDER: -3.25
OD_SPHERE: -5.75
OD_CYLINDER: -3.50
OS_SPHERE: -8.25
OS_AXIS: 165

## 2024-11-21 ASSESSMENT — REFRACTION_MANIFEST
OS_SPHERE: -7.75
OD_CYLINDER: -3.25
OS_VA1: 20/70-1
OD_SPHERE: -5.50
OD_AXIS: 5
OD_SPHERE: -5.75
OS_VA1: 20/70-1
OS_AXIS: 155
OS_CYLINDER: -3.25
OS_SPHERE: -7.25
OD_CYLINDER: -3.25
OD_VA1: 20/20-
OD_ADD: +2.00
OS_AXIS: 155
OS_ADD: +2.00
OS_CYLINDER: -2.25
OS_CYLINDER: -3.25
OD_CYLINDER: -2.50
OD_VA1: 20/40+2
OS_SPHERE: -8.00
OD_AXIS: 180
OD_AXIS: 180
OD_VA1: 20/40+2
OS_VA1: 20/50+
OS_AXIS: 155
OD_SPHERE: -5.75

## 2024-11-21 ASSESSMENT — LID POSITION - DERMATOCHALASIS
OS_DERMATOCHALASIS: LLL LUL 1+
OD_DERMATOCHALASIS: RLL RUL 1+

## 2024-11-21 ASSESSMENT — KERATOMETRY
OD_AXISANGLE_DEGREES: 094
OD_K2POWER_DIOPTERS: 49.00
OS_AXISANGLE_DEGREES: 078
OS_K2POWER_DIOPTERS: 48.50
METHOD_AUTO_MANUAL: AUTO
OD_K1POWER_DIOPTERS: 45.75
OS_K1POWER_DIOPTERS: 45.75

## 2024-11-21 ASSESSMENT — CONFRONTATIONAL VISUAL FIELD TEST (CVF)
OS_FINDINGS: FULL
OD_FINDINGS: FULL

## 2024-11-21 ASSESSMENT — VISUAL ACUITY
OS_BCVA: 20/50+2
OD_BCVA: 20/80-1

## 2025-02-19 ENCOUNTER — OFFICE (OUTPATIENT)
Dept: URBAN - METROPOLITAN AREA CLINIC 109 | Facility: CLINIC | Age: 55
Setting detail: OPHTHALMOLOGY
End: 2025-02-19
Payer: COMMERCIAL

## 2025-02-19 DIAGNOSIS — H53.433: ICD-10-CM

## 2025-02-19 DIAGNOSIS — E11.9: ICD-10-CM

## 2025-02-19 DIAGNOSIS — H47.013: ICD-10-CM

## 2025-02-19 PROCEDURE — 99213 OFFICE O/P EST LOW 20 MIN: CPT | Performed by: OPHTHALMOLOGY

## 2025-02-19 ASSESSMENT — REFRACTION_MANIFEST
OS_SPHERE: -7.75
OS_SPHERE: -8.00
OS_AXIS: 155
OD_SPHERE: -5.75
OS_ADD: +2.00
OD_AXIS: 5
OS_CYLINDER: -2.25
OS_VA1: 20/70-1
OD_VA1: 20/20-
OS_VA1: 20/50+
OS_VA1: 20/70-1
OD_CYLINDER: -2.50
OD_AXIS: 180
OS_CYLINDER: -3.25
OS_SPHERE: -7.25
OD_SPHERE: -5.50
OS_CYLINDER: -3.25
OD_CYLINDER: -3.25
OS_AXIS: 155
OD_ADD: +2.00
OD_VA1: 20/40+2
OD_SPHERE: -5.75
OD_AXIS: 180
OS_AXIS: 155
OD_VA1: 20/40+2
OD_CYLINDER: -3.25

## 2025-02-19 ASSESSMENT — CONFRONTATIONAL VISUAL FIELD TEST (CVF)
OS_FINDINGS: FULL
OD_FINDINGS: CONSTRICTION

## 2025-02-19 ASSESSMENT — LID POSITION - DERMATOCHALASIS
OS_DERMATOCHALASIS: LLL LUL 1+
OD_DERMATOCHALASIS: RLL RUL 1+

## 2025-02-19 ASSESSMENT — TONOMETRY
OD_IOP_MMHG: 17
OS_IOP_MMHG: 19

## 2025-02-19 ASSESSMENT — REFRACTION_AUTOREFRACTION
OS_AXIS: 160
OD_CYLINDER: +3.00
OD_SPHERE: -6.25
OD_AXIS: 10
OS_CYLINDER: -2.75
OS_SPHERE: -8.50

## 2025-02-19 ASSESSMENT — REFRACTION_CURRENTRX
OD_CYLINDER: -3.25
OD_OVR_VA: 20/
OS_SPHERE: -7.50
OS_VPRISM_DIRECTION: SV
OS_CYLINDER: -2.75
OD_VPRISM_DIRECTION: SV
OD_SPHERE: -5.25
OS_OVR_VA: 20/
OS_AXIS: 158
OD_AXIS: 019

## 2025-02-19 ASSESSMENT — VISUAL ACUITY
OS_BCVA: 20/20-1
OD_BCVA: 20/60

## 2025-02-19 ASSESSMENT — PACHYMETRY
OS_CT_UM: 586
OD_CT_UM: 583
OS_CT_CORRECTION: -3
OD_CT_CORRECTION: -3

## 2025-02-19 ASSESSMENT — KERATOMETRY
OD_K2POWER_DIOPTERS: 49.00
OS_K2POWER_DIOPTERS: 48.50
OS_AXISANGLE_DEGREES: 078
OD_K1POWER_DIOPTERS: 45.75
OD_AXISANGLE_DEGREES: 094
OS_K1POWER_DIOPTERS: 45.75
METHOD_AUTO_MANUAL: AUTO

## 2025-03-08 ENCOUNTER — OFFICE (OUTPATIENT)
Facility: LOCATION | Age: 55
Setting detail: OPHTHALMOLOGY
End: 2025-03-08
Payer: COMMERCIAL

## 2025-03-08 DIAGNOSIS — H47.013: ICD-10-CM

## 2025-03-08 DIAGNOSIS — H25.13: ICD-10-CM

## 2025-03-08 DIAGNOSIS — H53.433: ICD-10-CM

## 2025-03-08 DIAGNOSIS — E11.9: ICD-10-CM

## 2025-03-08 PROCEDURE — 92014 COMPRE OPH EXAM EST PT 1/>: CPT | Performed by: OPHTHALMOLOGY

## 2025-03-08 PROCEDURE — 92083 EXTENDED VISUAL FIELD XM: CPT | Performed by: OPHTHALMOLOGY

## 2025-03-08 PROCEDURE — 92133 CPTRZD OPH DX IMG PST SGM ON: CPT | Performed by: OPHTHALMOLOGY

## 2025-03-08 ASSESSMENT — REFRACTION_CURRENTRX
OD_VPRISM_DIRECTION: SV
OD_CYLINDER: -3.25
OD_AXIS: 004
OD_CYLINDER: -3.25
OD_SPHERE: -6.00
OS_CYLINDER: -2.75
OS_SPHERE: -7.50
OS_AXIS: 158
OS_OVR_VA: 20/
OD_OVR_VA: 20/
OD_SPHERE: -5.25
OS_VPRISM_DIRECTION: SV
OS_AXIS: 158
OD_VPRISM_DIRECTION: SV
OS_CYLINDER: -3.25
OD_OVR_VA: 20/
OS_VPRISM_DIRECTION: SV
OD_AXIS: 019
OS_SPHERE: -8.00
OS_OVR_VA: 20/

## 2025-03-08 ASSESSMENT — REFRACTION_MANIFEST
OS_SPHERE: -7.25
OS_VA1: 20/50+
OS_SPHERE: -8.00
OD_ADD: +2.00
OS_CYLINDER: -2.25
OD_CYLINDER: -3.25
OD_AXIS: 180
OD_VA1: 20/40+2
OS_AXIS: 155
OD_CYLINDER: -3.25
OS_SPHERE: -7.75
OS_VA1: 20/70-1
OS_CYLINDER: -3.25
OS_ADD: +2.00
OD_SPHERE: -5.75
OD_AXIS: 180
OS_AXIS: 155
OD_SPHERE: -5.50
OS_CYLINDER: -3.25
OS_AXIS: 155
OD_VA1: 20/20-
OD_CYLINDER: -2.50
OS_VA1: 20/70-1
OD_SPHERE: -5.75
OD_VA1: 20/40+2
OD_AXIS: 5

## 2025-03-08 ASSESSMENT — KERATOMETRY
OD_K2POWER_DIOPTERS: 49.00
OS_AXISANGLE_DEGREES: 080
OD_K1POWER_DIOPTERS: 45.75
METHOD_AUTO_MANUAL: AUTO
OS_K2POWER_DIOPTERS: 49.00
OS_K1POWER_DIOPTERS: 46.00
OD_AXISANGLE_DEGREES: 094

## 2025-03-08 ASSESSMENT — LID POSITION - DERMATOCHALASIS
OS_DERMATOCHALASIS: LLL LUL 1+
OD_DERMATOCHALASIS: RLL RUL 1+

## 2025-03-08 ASSESSMENT — REFRACTION_AUTOREFRACTION
OD_SPHERE: -6.25
OD_CYLINDER: -3.50
OS_AXIS: 166
OS_SPHERE: -8.75
OD_AXIS: 008
OS_CYLINDER: -3.25

## 2025-03-08 ASSESSMENT — VISUAL ACUITY
OS_BCVA: 20/20
OD_BCVA: 20/50+2

## 2025-03-08 ASSESSMENT — CONFRONTATIONAL VISUAL FIELD TEST (CVF)
OS_FINDINGS: FULL
OD_FINDINGS: FULL

## 2025-06-25 ENCOUNTER — OFFICE (OUTPATIENT)
Dept: URBAN - METROPOLITAN AREA CLINIC 109 | Facility: CLINIC | Age: 55
Setting detail: OPHTHALMOLOGY
End: 2025-06-25
Payer: COMMERCIAL

## 2025-06-25 DIAGNOSIS — H25.13: ICD-10-CM

## 2025-06-25 DIAGNOSIS — H47.013: ICD-10-CM

## 2025-06-25 DIAGNOSIS — E11.9: ICD-10-CM

## 2025-06-25 DIAGNOSIS — H35.033: ICD-10-CM

## 2025-06-25 PROCEDURE — 92014 COMPRE OPH EXAM EST PT 1/>: CPT | Performed by: OPHTHALMOLOGY

## 2025-06-25 ASSESSMENT — REFRACTION_MANIFEST
OD_AXIS: 5
OD_SPHERE: -5.50
OD_AXIS: 180
OS_VA1: 20/70-1
OS_VA1: 20/100
OD_ADD: +2.00
OD_VA1: 20/40+2
OS_VA1: 20/70-1
OS_CYLINDER: -3.25
OS_AXIS: 155
OS_ADD: +2.00
OS_CYLINDER: -2.25
OS_CYLINDER: -3.25
OD_CYLINDER: -2.50
OS_SPHERE: -7.75
OD_SPHERE: -5.75
OS_AXIS: 170
OS_AXIS: 155
OS_SPHERE: -8.00
OD_CYLINDER: -3.25
OD_SPHERE: -5.75
OD_AXIS: 180
OS_SPHERE: -7.75
OD_VA1: 20/25-2
OD_VA1: 20/40+2
OD_CYLINDER: -3.25

## 2025-06-25 ASSESSMENT — VISUAL ACUITY
OD_BCVA: 20/50+2
OS_BCVA: 20/20

## 2025-06-25 ASSESSMENT — REFRACTION_AUTOREFRACTION
OS_CYLINDER: -3.25
OD_CYLINDER: -3.50
OS_SPHERE: -8.75
OS_AXIS: 166
OD_AXIS: 008
OD_SPHERE: -6.25

## 2025-06-25 ASSESSMENT — PACHYMETRY
OS_CT_CORRECTION: -3
OD_CT_CORRECTION: -3
OD_CT_UM: 583
OS_CT_UM: 586

## 2025-06-25 ASSESSMENT — CONFRONTATIONAL VISUAL FIELD TEST (CVF)
OS_FINDINGS: FULL
OD_FINDINGS: FULL

## 2025-06-25 ASSESSMENT — KERATOMETRY
OD_K1POWER_DIOPTERS: 45.75
OS_K1POWER_DIOPTERS: 46.00
METHOD_AUTO_MANUAL: AUTO
OS_K2POWER_DIOPTERS: 49.00
OD_AXISANGLE_DEGREES: 094
OD_K2POWER_DIOPTERS: 49.00
OS_AXISANGLE_DEGREES: 080

## 2025-06-25 ASSESSMENT — REFRACTION_CURRENTRX
OD_OVR_VA: 20/
OS_VPRISM_DIRECTION: SV
OD_SPHERE: -6.00
OS_SPHERE: -7.50
OS_OVR_VA: 20/
OS_VPRISM_DIRECTION: SV
OS_CYLINDER: -2.75
OD_AXIS: 019
OD_SPHERE: -5.25
OS_OVR_VA: 20/
OD_CYLINDER: -3.25
OS_AXIS: 158
OD_OVR_VA: 20/
OD_CYLINDER: -3.25
OD_VPRISM_DIRECTION: SV
OS_AXIS: 158
OD_AXIS: 004
OD_VPRISM_DIRECTION: SV
OS_CYLINDER: -3.25
OS_SPHERE: -8.00

## 2025-06-25 ASSESSMENT — TONOMETRY
OS_IOP_MMHG: 17
OD_IOP_MMHG: 15

## 2025-06-25 ASSESSMENT — LID POSITION - DERMATOCHALASIS
OS_DERMATOCHALASIS: LLL LUL 1+
OD_DERMATOCHALASIS: RLL RUL 1+